# Patient Record
Sex: MALE | Race: WHITE | NOT HISPANIC OR LATINO | Employment: FULL TIME | URBAN - METROPOLITAN AREA
[De-identification: names, ages, dates, MRNs, and addresses within clinical notes are randomized per-mention and may not be internally consistent; named-entity substitution may affect disease eponyms.]

---

## 2021-05-26 ENCOUNTER — RECORDS - HEALTHEAST (OUTPATIENT)
Dept: ADMINISTRATIVE | Facility: CLINIC | Age: 51
End: 2021-05-26

## 2023-07-05 ENCOUNTER — HOSPITAL ENCOUNTER (EMERGENCY)
Facility: CLINIC | Age: 53
Discharge: HOME | End: 2023-07-05
Attending: EMERGENCY MEDICINE | Admitting: EMERGENCY MEDICINE
Payer: COMMERCIAL

## 2023-07-05 VITALS
SYSTOLIC BLOOD PRESSURE: 140 MMHG | WEIGHT: 262 LBS | RESPIRATION RATE: 18 BRPM | HEART RATE: 106 BPM | TEMPERATURE: 98 F | OXYGEN SATURATION: 99 % | DIASTOLIC BLOOD PRESSURE: 83 MMHG

## 2023-07-05 DIAGNOSIS — L02.214 ABSCESS OF GROIN, RIGHT: ICD-10-CM

## 2023-07-05 PROCEDURE — 99283 EMERGENCY DEPT VISIT LOW MDM: CPT | Mod: 25 | Performed by: EMERGENCY MEDICINE

## 2023-07-05 PROCEDURE — 10060 I&D ABSCESS SIMPLE/SINGLE: CPT | Performed by: EMERGENCY MEDICINE

## 2023-07-05 RX ORDER — SULFAMETHOXAZOLE/TRIMETHOPRIM 800-160 MG
1 TABLET ORAL 2 TIMES DAILY
Qty: 14 TABLET | Refills: 0 | Status: SHIPPED | OUTPATIENT
Start: 2023-07-05 | End: 2023-07-12

## 2023-07-05 RX ORDER — METRONIDAZOLE 500 MG/1
500 TABLET ORAL 3 TIMES DAILY
Qty: 21 TABLET | Refills: 0 | Status: SHIPPED | OUTPATIENT
Start: 2023-07-05 | End: 2023-07-12

## 2023-07-05 ASSESSMENT — ACTIVITIES OF DAILY LIVING (ADL): ADLS_ACUITY_SCORE: 35

## 2023-07-05 NOTE — ED PROVIDER NOTES
History     Chief Complaint   Patient presents with     Wound Infection     HPI  Darren Maurer is a 52 year old male who presents for a right groin abscess.  Was seen at a local urgent care center and recommended to come to the emergency department.  History for DM type II.  Previous sternotomy that became infected with staph requiring revision without hardware removal and prolonged antibiotics and wound care.  Reports diabetes been in good control with A1c less than 6.  The swelling in the right groin progressed over the last week to the point where got as large as an apple.  His wife used a stick pin to poke a hole in its been draining pus over the last 24 hours.  No systemic symptoms such as fever, chills or night sweats.  Discomfort especially when trying to walk.    Allergies:  Allergies   Allergen Reactions     Iodinated Contrast Media      Other reaction(s): Swelling, lips/tongue     Gabapentin      Penicillins        Problem List:    There are no problems to display for this patient.       Past Medical History:    History reviewed. No pertinent past medical history.    Past Surgical History:    History reviewed. No pertinent surgical history.    Family History:    No family history on file.    Social History:  Marital Status:   [2]  Social History     Tobacco Use     Smoking status: Never     Smokeless tobacco: Never        Medications:    No current outpatient medications on file.        Review of Systems   All other systems reviewed and are negative.      Physical Exam   BP: (!) 140/83  Pulse: 106  Temp: 98  F (36.7  C)  Resp: 18  Weight: 118.8 kg (262 lb)  SpO2: 99 %      Physical Exam  Vitals and nursing note reviewed.   Constitutional:       Appearance: He is not ill-appearing.   HENT:      Head: Normocephalic.      Nose: Nose normal.   Eyes:      Conjunctiva/sclera: Conjunctivae normal.   Cardiovascular:      Rate and Rhythm: Normal rate.   Pulmonary:      Effort: Pulmonary effort is normal.    Skin:     General: Skin is warm.      Capillary Refill: Capillary refill takes less than 2 seconds.      Findings: No rash.      Comments: Area of induration right inner thigh and groin area.  Does not involve the scrotum.  There is a small hole that is draining a yellow to tan-colored pus.  The area significantly decompressed and smaller than what was described before his wife used a needle to drain the abscess.     Neurological:      General: No focal deficit present.      Mental Status: He is alert and oriented to person, place, and time.   Psychiatric:         Mood and Affect: Mood normal.         Behavior: Behavior normal.         ED Course                 Procedures  Verbal consent from patient.  The small opening in the central part of the right groin abscess was identified.  Infiltrated around this region with 0.5% bupivacaine with epinephrine using a 30-gauge needle.  Total of 2.5 cc infiltrated.  I was able to place a small snap into the opening and does do blunt dissection stretching into the little under 1 cm.  Placed quarter inch wick dressing.  It tracked deep approximately 2 cm.                No results found for this or any previous visit (from the past 24 hour(s)).    Medications - No data to display    Assessments & Plan (with Medical Decision Making)  52 old male with history of DM type II under good control with A1c less than 6 presents with a right groin mass.  Referred from a local urgent care clinic.  Confirmed on exam that he had abscess which had opened and started to drain..  The abscess was as large as an apple per patient tells wife used to stick pin to pop the abscess and get it to drain.  Area of induration spreading of approximately 9 to 10 cm.  There was no scrotal involvement or scott's.  No inguinal adenopathy.  There was prepped with alcohol.  Completed incision and drainage with bupivacaine local infiltration place and quarter inch wick ribbon dressing.  Patient placed on  Bactrim DS and metronidazole 500 3 times daily for 7 days.  Packing can be removed Saturday morning.  May shower.     I have reviewed the nursing notes.    I have reviewed the findings, diagnosis, plan and need for follow up with the patient.          New Prescriptions    No medications on file       Final diagnoses:   Abscess of groin, right       7/5/2023   [facility] EMERGENCY DEPT     Shaquille Aburto, DO  07/05/23 9805

## 2023-07-05 NOTE — ED TRIAGE NOTES
Abscess in groin that was big and doing warm packs - started draining last Friday and now concern for infection.      Triage Assessment     Row Name 07/05/23 4231       Triage Assessment (Adult)    Airway WDL WDL       Respiratory WDL    Respiratory WDL WDL       Cardiac WDL    Cardiac WDL WDL

## 2023-07-05 NOTE — DISCHARGE INSTRUCTIONS
You may continue to shower    If the packing does not fall out on its own by Saturday you can remove    Bactrim DS twice daily for 7 days.  Metronidazole 5 mg 3 times daily x7 days    Primary care provider recheck in 1 week